# Patient Record
Sex: FEMALE | URBAN - METROPOLITAN AREA
[De-identification: names, ages, dates, MRNs, and addresses within clinical notes are randomized per-mention and may not be internally consistent; named-entity substitution may affect disease eponyms.]

---

## 2021-01-11 ENCOUNTER — OFFICE (OUTPATIENT)
Dept: URBAN - METROPOLITAN AREA CLINIC 67 | Facility: CLINIC | Age: 61
End: 2021-01-11

## 2021-01-11 VITALS
DIASTOLIC BLOOD PRESSURE: 88 MMHG | HEIGHT: 65 IN | WEIGHT: 195 LBS | TEMPERATURE: 97.2 F | SYSTOLIC BLOOD PRESSURE: 148 MMHG

## 2021-01-11 DIAGNOSIS — K59.00 CONSTIPATION: ICD-10-CM

## 2021-01-11 DIAGNOSIS — K62.5 RECTAL BLEEDING: ICD-10-CM

## 2021-01-11 DIAGNOSIS — Z12.11 SCREENING FOR COLON CANCER: ICD-10-CM

## 2021-01-11 PROCEDURE — 99243 OFF/OP CNSLTJ NEW/EST LOW 30: CPT | Performed by: NURSE PRACTITIONER

## 2021-01-11 NOTE — SERVICEHPINOTES
This is a   60   year old  female   seen   in consultation at the request of Dr. BEHZAD SOUFERZADEH  . Patient presents for a screening colonoscopy. There has been no previous colon screening. The patient has no family history of colon cancer or other significant GI diseases. Patient denies fever, nausea, vomiting, dysphagia, reflux, abdominal pain, diarrhea, melena, and significant change in weight. Denies shortness of breath and chest pain.    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 Constipation is persistent. Reports having had blood test w/ her PCP in 8/2020 but unsure if thyroid levels checked.

## 2021-01-12 LAB — TSH W/REFLEX TO FT4: 0.95 MIU/L (ref 0.4–4.5)

## 2021-07-09 ENCOUNTER — AMBULATORY SURGICAL CENTER (OUTPATIENT)
Dept: URBAN - METROPOLITAN AREA SURGERY 42 | Facility: SURGERY | Age: 61
End: 2021-07-09

## 2021-07-09 ENCOUNTER — HOSPITAL ENCOUNTER (EMERGENCY)
Dept: HOSPITAL 54 - ER | Age: 61
Discharge: HOME | End: 2021-07-09
Payer: COMMERCIAL

## 2021-07-09 VITALS
SYSTOLIC BLOOD PRESSURE: 143 MMHG | WEIGHT: 198 LBS | OXYGEN SATURATION: 97 % | TEMPERATURE: 97 F | RESPIRATION RATE: 18 BRPM | DIASTOLIC BLOOD PRESSURE: 67 MMHG | HEIGHT: 65 IN | HEART RATE: 73 BPM

## 2021-07-09 VITALS — HEIGHT: 67 IN | WEIGHT: 197 LBS | BODY MASS INDEX: 30.92 KG/M2

## 2021-07-09 VITALS — DIASTOLIC BLOOD PRESSURE: 79 MMHG | SYSTOLIC BLOOD PRESSURE: 131 MMHG

## 2021-07-09 DIAGNOSIS — K59.00 CONSTIPATION, UNSPECIFIED: ICD-10-CM

## 2021-07-09 DIAGNOSIS — R10.13: Primary | ICD-10-CM

## 2021-07-09 DIAGNOSIS — Z90.89: ICD-10-CM

## 2021-07-09 DIAGNOSIS — Z98.890: ICD-10-CM

## 2021-07-09 DIAGNOSIS — Z88.8: ICD-10-CM

## 2021-07-09 DIAGNOSIS — K62.5 HEMORRHAGE OF ANUS AND RECTUM: ICD-10-CM

## 2021-07-09 PROBLEM — K62.1 RECTAL POLYP: Status: ACTIVE | Noted: 2021-07-09

## 2021-07-09 LAB
ALBUMIN SERPL BCP-MCNC: 3.9 G/DL (ref 3.4–5)
ALP SERPL-CCNC: 79 U/L (ref 46–116)
ALT SERPL W P-5'-P-CCNC: 34 U/L (ref 12–78)
AST SERPL W P-5'-P-CCNC: 17 U/L (ref 15–37)
BASOPHILS # BLD AUTO: 0 K/UL (ref 0–0.2)
BASOPHILS NFR BLD AUTO: 1 % (ref 0–2)
BILIRUB DIRECT SERPL-MCNC: 0.2 MG/DL (ref 0–0.2)
BILIRUB SERPL-MCNC: 0.7 MG/DL (ref 0.2–1)
BUN SERPL-MCNC: 12 MG/DL (ref 7–18)
CALCIUM SERPL-MCNC: 9.2 MG/DL (ref 8.5–10.1)
CHLORIDE SERPL-SCNC: 102 MMOL/L (ref 98–107)
CO2 SERPL-SCNC: 28 MMOL/L (ref 21–32)
COLOR UR: YELLOW
CREAT SERPL-MCNC: 0.9 MG/DL (ref 0.6–1.3)
EOSINOPHIL NFR BLD AUTO: 3 % (ref 0–6)
GLUCOSE SERPL-MCNC: 106 MG/DL (ref 74–106)
HCT VFR BLD AUTO: 42 % (ref 33–45)
HGB BLD-MCNC: 13.8 G/DL (ref 11.5–14.8)
LIPASE SERPL-CCNC: 168 U/L (ref 73–393)
LYMPHOCYTES NFR BLD AUTO: 2.3 K/UL (ref 0.8–4.8)
LYMPHOCYTES NFR BLD AUTO: 47.4 % (ref 20–44)
MCHC RBC AUTO-ENTMCNC: 33 G/DL (ref 31–36)
MCV RBC AUTO: 90 FL (ref 82–100)
MONOCYTES NFR BLD AUTO: 0.6 K/UL (ref 0.1–1.3)
MONOCYTES NFR BLD AUTO: 13 % (ref 2–12)
NEUTROPHILS # BLD AUTO: 1.7 K/UL (ref 1.8–8.9)
NEUTROPHILS NFR BLD AUTO: 35.6 % (ref 43–81)
PH UR STRIP: 7.5 [PH] (ref 5–8)
PLATELET # BLD AUTO: 319 K/UL (ref 150–450)
POTASSIUM SERPL-SCNC: 3.4 MMOL/L (ref 3.5–5.1)
PROT SERPL-MCNC: 7.9 G/DL (ref 6.4–8.2)
RBC # BLD AUTO: 4.67 MIL/UL (ref 4–5.2)
RBC #/AREA URNS HPF: (no result) /HPF (ref 0–2)
SODIUM SERPL-SCNC: 139 MMOL/L (ref 136–145)
UROBILINOGEN UR STRIP-MCNC: 0.2 EU/DL
WBC #/AREA URNS HPF: (no result) /HPF (ref 0–3)
WBC NRBC COR # BLD AUTO: 4.8 K/UL (ref 4.3–11)

## 2021-07-09 PROCEDURE — 45385 COLONOSCOPY W/LESION REMOVAL: CPT | Performed by: INTERNAL MEDICINE

## 2021-07-09 PROCEDURE — 81001 URINALYSIS AUTO W/SCOPE: CPT

## 2021-07-09 PROCEDURE — 85378 FIBRIN DEGRADE SEMIQUANT: CPT

## 2021-07-09 PROCEDURE — 71045 X-RAY EXAM CHEST 1 VIEW: CPT

## 2021-07-09 PROCEDURE — 85025 COMPLETE CBC W/AUTO DIFF WBC: CPT

## 2021-07-09 PROCEDURE — 36415 COLL VENOUS BLD VENIPUNCTURE: CPT

## 2021-07-09 PROCEDURE — 45380 COLONOSCOPY AND BIOPSY: CPT | Mod: 59 | Performed by: INTERNAL MEDICINE

## 2021-07-09 PROCEDURE — 84484 ASSAY OF TROPONIN QUANT: CPT

## 2021-07-09 PROCEDURE — 80048 BASIC METABOLIC PNL TOTAL CA: CPT

## 2021-07-09 PROCEDURE — 99285 EMERGENCY DEPT VISIT HI MDM: CPT

## 2021-07-09 PROCEDURE — 83690 ASSAY OF LIPASE: CPT

## 2021-07-09 PROCEDURE — 93005 ELECTROCARDIOGRAM TRACING: CPT

## 2021-07-09 PROCEDURE — 96374 THER/PROPH/DIAG INJ IV PUSH: CPT

## 2021-07-09 PROCEDURE — 96375 TX/PRO/DX INJ NEW DRUG ADDON: CPT

## 2021-07-09 PROCEDURE — 74176 CT ABD & PELVIS W/O CONTRAST: CPT

## 2021-07-09 PROCEDURE — 85730 THROMBOPLASTIN TIME PARTIAL: CPT

## 2021-07-09 PROCEDURE — 80076 HEPATIC FUNCTION PANEL: CPT

## 2021-07-09 RX ADMIN — SODIUM CHLORIDE ONE ML: 9 INJECTION, SOLUTION INTRAVENOUS at 20:15

## 2021-07-09 RX ADMIN — Medication ONE MG: at 20:17

## 2021-07-09 RX ADMIN — DEXTROSE MONOHYDRATE ONE MG: 50 INJECTION, SOLUTION INTRAVENOUS at 20:15

## 2021-07-09 NOTE — NUR
PATIENT CAME TO THE ER BED 11 C/O MIDEPIGASTRIC PAIN THAT RADIATES TO THE BACK. 
PATIENT STATES THAT SHE HAD A COLONOSCOPY DONE TODAY. PATIENT IS ALERT AND 
ORIENTED x4. DENIES SHORTNESS OF BREATH. PATIENT IS BREATHING EVENLY AND 
UNLABORED ON ROOM AIR %. CONNECTED TO THE MONITOR.

## 2021-07-09 NOTE — SERVICEHPINOTES
This is a 60 year old female seen in consultation at the request of Dr. BEHZAD SOUFERZADEH. Patient presents for a screening colonoscopy. There has been no previous colon screening. The patient has no family history of colon cancer or other significant GI diseases. Patient denies fever, nausea, vomiting, dysphagia, reflux, abdominal pain, diarrhea, melena, and significant change in weight. Denies shortness of breath and chest pain. 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 Constipation is persistent. Reports having had blood test w/ her PCP in 8/2020 but unsure if thyroid levels checked.

## 2021-07-13 ENCOUNTER — OFFICE (OUTPATIENT)
Dept: URBAN - METROPOLITAN AREA CLINIC 67 | Facility: CLINIC | Age: 61
End: 2021-07-13

## 2021-07-13 VITALS
TEMPERATURE: 96.8 F | WEIGHT: 191 LBS | SYSTOLIC BLOOD PRESSURE: 141 MMHG | DIASTOLIC BLOOD PRESSURE: 87 MMHG | HEIGHT: 65 IN

## 2021-07-13 DIAGNOSIS — Z86.010 PERSONAL HISTORY COLON POLYPS: ICD-10-CM

## 2021-07-13 DIAGNOSIS — K62.5 RECTAL BLEEDING: ICD-10-CM

## 2021-07-13 DIAGNOSIS — R11.10 VOMITING: ICD-10-CM

## 2021-07-13 DIAGNOSIS — R11.2 NAUSEA WITH VOMITING, UNSPECIFIED: ICD-10-CM

## 2021-07-13 PROCEDURE — 99214 OFFICE O/P EST MOD 30 MIN: CPT | Performed by: INTERNAL MEDICINE

## 2021-07-13 NOTE — SERVICEHPINOTES
Patient previously seen with the following notes:1/11/21:  Patient presents for a screening colonoscopy. There has been no previous colon screening. The patient has no family history of colon cancer or other significant GI diseases. Patient denies fever, nausea, vomiting, dysphagia, reflux, abdominal pain, diarrhea, melena, and significant change in weight. Denies shortness of breath and chest pain. 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 Constipation is persistent. Reports having had blood test w/ her PCP in 8/2020 but unsure if thyroid levels checked.    TSH 1/11/21: normal7/13/21: after her colonoscopy, patient noted having nausea and vomiting for which he went to the emergency room.  Also had some chest pains.  Reportedly, scans and labs in the emergency room were okay.  In the emergency room she received additional morphine.  After receiving morphine, she had further nausea and vomiting.  Today she is feeling much better.  She no longer is having any symptoms.  She has been on a liquid diet today and thinks that is helping.

## 2021-07-20 ENCOUNTER — OFFICE (OUTPATIENT)
Dept: URBAN - METROPOLITAN AREA CLINIC 67 | Facility: CLINIC | Age: 61
End: 2021-07-20

## 2021-07-20 VITALS
SYSTOLIC BLOOD PRESSURE: 120 MMHG | DIASTOLIC BLOOD PRESSURE: 72 MMHG | HEIGHT: 65 IN | WEIGHT: 193 LBS | TEMPERATURE: 97.3 F

## 2021-07-20 DIAGNOSIS — K62.5 RECTAL BLEEDING: ICD-10-CM

## 2021-07-20 DIAGNOSIS — R11.2 NAUSEA WITH VOMITING, UNSPECIFIED: ICD-10-CM

## 2021-07-20 DIAGNOSIS — R10.13 EPIGASTRIC PAIN: ICD-10-CM

## 2021-07-20 PROBLEM — K63.5 POLYP OF COLON: Status: ACTIVE | Noted: 2021-07-09

## 2021-07-20 PROCEDURE — 99214 OFFICE O/P EST MOD 30 MIN: CPT | Performed by: INTERNAL MEDICINE

## 2021-07-20 RX ORDER — OMEPRAZOLE 40 MG/1
CAPSULE, DELAYED RELEASE ORAL
Qty: 0 | Status: ACTIVE

## 2021-07-20 NOTE — SERVICEHPINOTES
Patient previously seen with the following notes:1/11/21: Patient presents for a screening colonoscopy. There has been no previous colon screening. The patient has no family history of colon cancer or other significant GI diseases. Patient denies fever, nausea, vomiting, dysphagia, reflux, abdominal pain, diarrhea, melena, and significant change in weight. Denies shortness of breath and chest pain. 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 Constipation is persistent. Reports having had blood test w/ her PCP in 8/2020 but unsure if thyroid levels checked. TSH 1/11/21: normal7/13/21: after her colonoscopy, patient noted having nausea and vomiting for which he went to the emergency room. Also had some chest pains. Reportedly, scans and labs in the emergency room were okay. In the emergency room she received additional morphine. After receiving morphine, she had further nausea and vomiting. Today she is feeling much better. She no longer is having any symptoms. She has been on a liquid diet today and thinks that is helping. BR   BR7/20/21: Not having any chest pain or nausea.  Is reporting epigastric abdominal discomfort ongoing.  Nothing makes it better and nothing makes it worse.